# Patient Record
Sex: MALE | Race: ASIAN | NOT HISPANIC OR LATINO
[De-identification: names, ages, dates, MRNs, and addresses within clinical notes are randomized per-mention and may not be internally consistent; named-entity substitution may affect disease eponyms.]

---

## 2017-01-20 ENCOUNTER — APPOINTMENT (OUTPATIENT)
Dept: CARDIOLOGY | Facility: CLINIC | Age: 67
End: 2017-01-20

## 2017-01-20 ENCOUNTER — NON-APPOINTMENT (OUTPATIENT)
Age: 67
End: 2017-01-20

## 2017-01-20 VITALS
BODY MASS INDEX: 28.35 KG/M2 | WEIGHT: 198 LBS | DIASTOLIC BLOOD PRESSURE: 81 MMHG | HEIGHT: 70 IN | RESPIRATION RATE: 17 BRPM | OXYGEN SATURATION: 97 % | SYSTOLIC BLOOD PRESSURE: 143 MMHG | HEART RATE: 82 BPM

## 2017-01-20 DIAGNOSIS — R07.89 OTHER CHEST PAIN: ICD-10-CM

## 2017-01-20 DIAGNOSIS — I77.9 DISORDER OF ARTERIES AND ARTERIOLES, UNSPECIFIED: ICD-10-CM

## 2017-01-23 PROBLEM — R07.89 ATYPICAL CHEST PAIN: Status: ACTIVE | Noted: 2017-01-23

## 2017-01-25 ENCOUNTER — RX RENEWAL (OUTPATIENT)
Age: 67
End: 2017-01-25

## 2017-01-25 DIAGNOSIS — R51 HEADACHE: ICD-10-CM

## 2017-02-06 ENCOUNTER — APPOINTMENT (OUTPATIENT)
Dept: MRI IMAGING | Facility: HOSPITAL | Age: 67
End: 2017-02-06

## 2017-02-07 ENCOUNTER — APPOINTMENT (OUTPATIENT)
Dept: MRI IMAGING | Facility: HOSPITAL | Age: 67
End: 2017-02-07

## 2017-03-07 ENCOUNTER — APPOINTMENT (OUTPATIENT)
Dept: ELECTROPHYSIOLOGY | Facility: CLINIC | Age: 67
End: 2017-03-07

## 2017-03-07 ENCOUNTER — APPOINTMENT (OUTPATIENT)
Dept: CARDIOLOGY | Facility: CLINIC | Age: 67
End: 2017-03-07

## 2017-03-07 ENCOUNTER — NON-APPOINTMENT (OUTPATIENT)
Age: 67
End: 2017-03-07

## 2017-03-07 VITALS — HEART RATE: 48 BPM | SYSTOLIC BLOOD PRESSURE: 146 MMHG | DIASTOLIC BLOOD PRESSURE: 78 MMHG | OXYGEN SATURATION: 97 %

## 2017-03-07 DIAGNOSIS — N52.9 MALE ERECTILE DYSFUNCTION, UNSPECIFIED: ICD-10-CM

## 2017-03-07 DIAGNOSIS — R00.2 PALPITATIONS: ICD-10-CM

## 2017-03-08 ENCOUNTER — APPOINTMENT (OUTPATIENT)
Dept: ORTHOPEDIC SURGERY | Facility: CLINIC | Age: 67
End: 2017-03-08

## 2017-04-11 ENCOUNTER — OTHER (OUTPATIENT)
Age: 67
End: 2017-04-11

## 2017-05-15 ENCOUNTER — RX RENEWAL (OUTPATIENT)
Age: 67
End: 2017-05-15

## 2017-05-15 DIAGNOSIS — E03.9 HYPOTHYROIDISM, UNSPECIFIED: ICD-10-CM

## 2017-05-15 RX ORDER — SILDENAFIL CITRATE 50 MG/1
50 TABLET, FILM COATED ORAL
Qty: 10 | Refills: 2 | Status: DISCONTINUED | COMMUNITY
Start: 2017-03-08 | End: 2017-05-15

## 2017-06-23 ENCOUNTER — APPOINTMENT (OUTPATIENT)
Dept: CARDIOLOGY | Facility: CLINIC | Age: 67
End: 2017-06-23

## 2017-06-23 ENCOUNTER — NON-APPOINTMENT (OUTPATIENT)
Age: 67
End: 2017-06-23

## 2017-06-23 VITALS
HEART RATE: 76 BPM | DIASTOLIC BLOOD PRESSURE: 76 MMHG | RESPIRATION RATE: 16 BRPM | WEIGHT: 207 LBS | HEIGHT: 70 IN | OXYGEN SATURATION: 98 % | SYSTOLIC BLOOD PRESSURE: 118 MMHG | BODY MASS INDEX: 29.63 KG/M2

## 2017-06-23 LAB
INR PPP: 2.2 RATIO
QUALITY CONTROL: YES

## 2017-07-06 ENCOUNTER — MEDICATION RENEWAL (OUTPATIENT)
Age: 67
End: 2017-07-06

## 2017-08-09 ENCOUNTER — RX RENEWAL (OUTPATIENT)
Age: 67
End: 2017-08-09

## 2017-10-11 ENCOUNTER — RX RENEWAL (OUTPATIENT)
Age: 67
End: 2017-10-11

## 2017-10-18 LAB
INR PPP: 1.5
PT BLD: 15.1

## 2017-11-07 LAB
INR PPP: 1.8
PT BLD: 17.7

## 2017-11-29 ENCOUNTER — APPOINTMENT (OUTPATIENT)
Dept: CARDIOLOGY | Facility: CLINIC | Age: 67
End: 2017-11-29
Payer: MEDICARE

## 2017-11-29 ENCOUNTER — NON-APPOINTMENT (OUTPATIENT)
Age: 67
End: 2017-11-29

## 2017-11-29 ENCOUNTER — RX RENEWAL (OUTPATIENT)
Age: 67
End: 2017-11-29

## 2017-11-29 VITALS
SYSTOLIC BLOOD PRESSURE: 111 MMHG | HEART RATE: 79 BPM | OXYGEN SATURATION: 97 % | BODY MASS INDEX: 29.35 KG/M2 | HEIGHT: 70 IN | DIASTOLIC BLOOD PRESSURE: 72 MMHG | RESPIRATION RATE: 16 BRPM | WEIGHT: 205 LBS

## 2017-11-29 DIAGNOSIS — E78.5 HYPERLIPIDEMIA, UNSPECIFIED: ICD-10-CM

## 2017-11-29 LAB
INR PPP: 2.2 RATIO
POCT-PROTHROMBIN TIME: 28.6 SECS
QUALITY CONTROL: YES

## 2017-11-29 PROCEDURE — 99214 OFFICE O/P EST MOD 30 MIN: CPT

## 2017-11-29 PROCEDURE — 93000 ELECTROCARDIOGRAM COMPLETE: CPT

## 2017-11-29 PROCEDURE — 85610 PROTHROMBIN TIME: CPT | Mod: QW

## 2017-11-29 RX ORDER — AMOXICILLIN 500 MG/1
500 CAPSULE ORAL
Qty: 20 | Refills: 2 | Status: ACTIVE | COMMUNITY
Start: 2017-11-29 | End: 1900-01-01

## 2017-11-30 ENCOUNTER — APPOINTMENT (OUTPATIENT)
Dept: INTERNAL MEDICINE | Facility: CLINIC | Age: 67
End: 2017-11-30
Payer: MEDICARE

## 2017-11-30 ENCOUNTER — MEDICATION RENEWAL (OUTPATIENT)
Age: 67
End: 2017-11-30

## 2017-11-30 VITALS
HEIGHT: 70 IN | WEIGHT: 200 LBS | HEART RATE: 80 BPM | OXYGEN SATURATION: 99 % | RESPIRATION RATE: 16 BRPM | SYSTOLIC BLOOD PRESSURE: 128 MMHG | DIASTOLIC BLOOD PRESSURE: 70 MMHG | TEMPERATURE: 97.7 F | BODY MASS INDEX: 28.63 KG/M2

## 2017-11-30 DIAGNOSIS — Z82.49 FAMILY HISTORY OF ISCHEMIC HEART DISEASE AND OTHER DISEASES OF THE CIRCULATORY SYSTEM: ICD-10-CM

## 2017-11-30 DIAGNOSIS — I65.29 OCCLUSION AND STENOSIS OF UNSPECIFIED CAROTID ARTERY: ICD-10-CM

## 2017-11-30 DIAGNOSIS — E55.9 VITAMIN D DEFICIENCY, UNSPECIFIED: ICD-10-CM

## 2017-11-30 DIAGNOSIS — Z78.9 OTHER SPECIFIED HEALTH STATUS: ICD-10-CM

## 2017-11-30 DIAGNOSIS — Z28.21 IMMUNIZATION NOT CARRIED OUT BECAUSE OF PATIENT REFUSAL: ICD-10-CM

## 2017-11-30 DIAGNOSIS — Z00.00 ENCOUNTER FOR GENERAL ADULT MEDICAL EXAMINATION W/OUT ABNORMAL FINDINGS: ICD-10-CM

## 2017-11-30 PROCEDURE — 99214 OFFICE O/P EST MOD 30 MIN: CPT

## 2017-11-30 RX ORDER — SILDENAFIL CITRATE 100 MG/1
100 TABLET, FILM COATED ORAL
Qty: 6 | Refills: 0 | Status: DISCONTINUED | COMMUNITY
Start: 2017-02-06 | End: 2017-11-30

## 2017-11-30 RX ORDER — ASPIRIN 81 MG/1
81 TABLET, CHEWABLE ORAL
Refills: 0 | Status: ACTIVE | COMMUNITY
Start: 2017-03-08

## 2017-11-30 RX ORDER — ALPRAZOLAM 0.25 MG/1
0.25 TABLET ORAL
Qty: 10 | Refills: 0 | Status: DISCONTINUED | COMMUNITY
Start: 2017-02-06 | End: 2017-11-30

## 2017-12-01 LAB
25(OH)D3 SERPL-MCNC: 25.5 NG/ML
ALBUMIN SERPL ELPH-MCNC: 5 G/DL
ALP BLD-CCNC: 62 U/L
ALT SERPL-CCNC: 34 U/L
ANION GAP SERPL CALC-SCNC: 19 MMOL/L
APPEARANCE: CLEAR
AST SERPL-CCNC: 60 U/L
BACTERIA: NEGATIVE
BASOPHILS # BLD AUTO: 0.06 K/UL
BASOPHILS NFR BLD AUTO: 0.7 %
BILIRUB SERPL-MCNC: 0.8 MG/DL
BILIRUBIN URINE: NEGATIVE
BLOOD URINE: NEGATIVE
BUN SERPL-MCNC: 20 MG/DL
CALCIUM SERPL-MCNC: 10.5 MG/DL
CHLORIDE SERPL-SCNC: 97 MMOL/L
CHOLEST SERPL-MCNC: 206 MG/DL
CHOLEST/HDLC SERPL: 3.2 RATIO
CO2 SERPL-SCNC: 27 MMOL/L
COLOR: YELLOW
CREAT SERPL-MCNC: 1.3 MG/DL
EOSINOPHIL # BLD AUTO: 0.37 K/UL
EOSINOPHIL NFR BLD AUTO: 4.3 %
GLUCOSE QUALITATIVE U: NEGATIVE MG/DL
GLUCOSE SERPL-MCNC: 102 MG/DL
HBA1C MFR BLD HPLC: 7 %
HCT VFR BLD CALC: 48.5 %
HDLC SERPL-MCNC: 65 MG/DL
HGB BLD-MCNC: 16.2 G/DL
HYALINE CASTS: 0 /LPF
IMM GRANULOCYTES NFR BLD AUTO: 0.7 %
KETONES URINE: NEGATIVE
LDLC SERPL CALC-MCNC: 101 MG/DL
LEUKOCYTE ESTERASE URINE: NEGATIVE
LYMPHOCYTES # BLD AUTO: 2.94 K/UL
LYMPHOCYTES NFR BLD AUTO: 34.3 %
MAN DIFF?: NORMAL
MCHC RBC-ENTMCNC: 30.6 PG
MCHC RBC-ENTMCNC: 33.4 GM/DL
MCV RBC AUTO: 91.5 FL
MICROSCOPIC-UA: NORMAL
MONOCYTES # BLD AUTO: 0.9 K/UL
MONOCYTES NFR BLD AUTO: 10.5 %
NEUTROPHILS # BLD AUTO: 4.25 K/UL
NEUTROPHILS NFR BLD AUTO: 49.5 %
NITRITE URINE: NEGATIVE
PH URINE: 5
PLATELET # BLD AUTO: 218 K/UL
POTASSIUM SERPL-SCNC: 3.9 MMOL/L
PROT SERPL-MCNC: 8.8 G/DL
PROTEIN URINE: NEGATIVE MG/DL
PSA SERPL-MCNC: 2.05 NG/ML
RBC # BLD: 5.3 M/UL
RBC # FLD: 14.5 %
RED BLOOD CELLS URINE: 0 /HPF
SODIUM SERPL-SCNC: 143 MMOL/L
SPECIFIC GRAVITY URINE: 1.01
SQUAMOUS EPITHELIAL CELLS: 0 /HPF
T4 FREE SERPL-MCNC: 1.7 NG/DL
T4 SERPL-MCNC: 9.3 UG/DL
TRIGL SERPL-MCNC: 199 MG/DL
TSH SERPL-ACNC: 4.13 UIU/ML
UROBILINOGEN URINE: NEGATIVE MG/DL
WBC # FLD AUTO: 8.58 K/UL
WHITE BLOOD CELLS URINE: 0 /HPF

## 2018-01-04 ENCOUNTER — RX RENEWAL (OUTPATIENT)
Age: 68
End: 2018-01-04

## 2018-02-11 ENCOUNTER — RX RENEWAL (OUTPATIENT)
Age: 68
End: 2018-02-11

## 2018-02-16 ENCOUNTER — APPOINTMENT (OUTPATIENT)
Dept: CARDIOLOGY | Facility: CLINIC | Age: 68
End: 2018-02-16
Payer: MEDICARE

## 2018-02-16 ENCOUNTER — NON-APPOINTMENT (OUTPATIENT)
Age: 68
End: 2018-02-16

## 2018-02-16 VITALS
BODY MASS INDEX: 28.92 KG/M2 | HEART RATE: 69 BPM | WEIGHT: 202 LBS | OXYGEN SATURATION: 97 % | RESPIRATION RATE: 15 BRPM | HEIGHT: 70 IN | DIASTOLIC BLOOD PRESSURE: 64 MMHG | SYSTOLIC BLOOD PRESSURE: 106 MMHG

## 2018-02-16 DIAGNOSIS — I34.0 NONRHEUMATIC MITRAL (VALVE) INSUFFICIENCY: ICD-10-CM

## 2018-02-16 DIAGNOSIS — I48.91 UNSPECIFIED ATRIAL FIBRILLATION: ICD-10-CM

## 2018-02-16 LAB — INR PPP: 1.3 RATIO

## 2018-02-16 PROCEDURE — 99214 OFFICE O/P EST MOD 30 MIN: CPT

## 2018-02-16 PROCEDURE — 93000 ELECTROCARDIOGRAM COMPLETE: CPT

## 2018-02-16 PROCEDURE — 85610 PROTHROMBIN TIME: CPT | Mod: QW

## 2018-02-26 LAB
INR PPP: 1.9
PT BLD: 19

## 2018-03-09 LAB
INR PPP: 2.9
PT BLD: 28.2

## 2018-03-20 LAB
INR PPP: 1.7
PT BLD: 17.3

## 2018-05-16 ENCOUNTER — RX RENEWAL (OUTPATIENT)
Age: 68
End: 2018-05-16

## 2018-06-20 LAB
INR PPP: 1.6
PT BLD: 16.6

## 2018-06-22 LAB
INR PPP: 1.8
PT BLD: 17.9

## 2018-06-26 ENCOUNTER — RX RENEWAL (OUTPATIENT)
Age: 68
End: 2018-06-26

## 2018-08-03 ENCOUNTER — NON-APPOINTMENT (OUTPATIENT)
Age: 68
End: 2018-08-03

## 2018-08-03 ENCOUNTER — APPOINTMENT (OUTPATIENT)
Dept: CARDIOLOGY | Facility: CLINIC | Age: 68
End: 2018-08-03
Payer: MEDICARE

## 2018-08-03 VITALS
WEIGHT: 198 LBS | HEART RATE: 71 BPM | BODY MASS INDEX: 28.35 KG/M2 | SYSTOLIC BLOOD PRESSURE: 124 MMHG | HEIGHT: 70 IN | DIASTOLIC BLOOD PRESSURE: 70 MMHG | RESPIRATION RATE: 15 BRPM | OXYGEN SATURATION: 97 %

## 2018-08-03 DIAGNOSIS — I50.33 ACUTE ON CHRONIC DIASTOLIC (CONGESTIVE) HEART FAILURE: ICD-10-CM

## 2018-08-03 DIAGNOSIS — R06.02 SHORTNESS OF BREATH: ICD-10-CM

## 2018-08-03 LAB
INR PPP: 2
PT BLD: 19.9

## 2018-08-03 PROCEDURE — 93000 ELECTROCARDIOGRAM COMPLETE: CPT

## 2018-08-03 PROCEDURE — 99215 OFFICE O/P EST HI 40 MIN: CPT

## 2018-08-11 ENCOUNTER — RX RENEWAL (OUTPATIENT)
Age: 68
End: 2018-08-11

## 2018-08-11 RX ORDER — LABETALOL HYDROCHLORIDE 100 MG/1
100 TABLET, FILM COATED ORAL
Qty: 180 | Refills: 2 | Status: ACTIVE | COMMUNITY
Start: 2017-03-08 | End: 1900-01-01

## 2018-08-21 ENCOUNTER — RX RENEWAL (OUTPATIENT)
Age: 68
End: 2018-08-21

## 2018-08-21 RX ORDER — WARFARIN SODIUM 2.5 MG/1
2.5 TABLET ORAL
Qty: 90 | Refills: 1 | Status: ACTIVE | COMMUNITY
Start: 2017-10-11 | End: 1900-01-01

## 2018-09-13 LAB
INR PPP: 2.5
PT BLD: 24.9

## 2018-11-12 ENCOUNTER — RX RENEWAL (OUTPATIENT)
Age: 68
End: 2018-11-12

## 2018-11-12 RX ORDER — LEVOTHYROXINE SODIUM 0.05 MG/1
50 TABLET ORAL
Qty: 90 | Refills: 1 | Status: ACTIVE | COMMUNITY
Start: 2018-05-16 | End: 1900-01-01

## 2018-11-28 LAB
INR PPP: 2.2
PT BLD: 22

## 2018-12-07 ENCOUNTER — APPOINTMENT (OUTPATIENT)
Dept: CARDIOLOGY | Facility: CLINIC | Age: 68
End: 2018-12-07
Payer: MEDICARE

## 2018-12-07 VITALS
BODY MASS INDEX: 28.49 KG/M2 | HEART RATE: 74 BPM | WEIGHT: 199 LBS | RESPIRATION RATE: 15 BRPM | HEIGHT: 70 IN | OXYGEN SATURATION: 98 % | SYSTOLIC BLOOD PRESSURE: 144 MMHG | DIASTOLIC BLOOD PRESSURE: 86 MMHG

## 2018-12-07 DIAGNOSIS — R06.00 DYSPNEA, UNSPECIFIED: ICD-10-CM

## 2018-12-07 DIAGNOSIS — I25.10 ATHEROSCLEROTIC HEART DISEASE OF NATIVE CORONARY ARTERY W/OUT ANGINA PECTORIS: ICD-10-CM

## 2018-12-07 PROCEDURE — 99214 OFFICE O/P EST MOD 30 MIN: CPT | Mod: 25

## 2018-12-07 PROCEDURE — 93000 ELECTROCARDIOGRAM COMPLETE: CPT | Mod: 59

## 2018-12-07 PROCEDURE — 93015 CV STRESS TEST SUPVJ I&R: CPT

## 2018-12-08 PROBLEM — I25.10 ARTERIOSCLEROSIS OF CORONARY ARTERY: Status: ACTIVE | Noted: 2018-08-03

## 2018-12-08 PROBLEM — R06.00 DYSPNEA: Status: ACTIVE | Noted: 2018-08-03

## 2018-12-08 NOTE — PHYSICAL EXAM
[General Appearance - Well Developed] : well developed [Normal Appearance] : normal appearance [Well Groomed] : well groomed [General Appearance - Well Nourished] : well nourished [No Deformities] : no deformities [General Appearance - In No Acute Distress] : no acute distress [Normal Conjunctiva] : the conjunctiva exhibited no abnormalities [Eyelids - No Xanthelasma] : the eyelids demonstrated no xanthelasmas [Normal Oral Mucosa] : normal oral mucosa [No Oral Pallor] : no oral pallor [No Oral Cyanosis] : no oral cyanosis [Normal Jugular Venous A Waves Present] : normal jugular venous A waves present [Normal Jugular Venous V Waves Present] : normal jugular venous V waves present [No Jugular Venous Beckford A Waves] : no jugular venous beckford A waves [Respiration, Rhythm And Depth] : normal respiratory rhythm and effort [Exaggerated Use Of Accessory Muscles For Inspiration] : no accessory muscle use [Auscultation Breath Sounds / Voice Sounds] : lungs were clear to auscultation bilaterally [Heart Rate And Rhythm] : heart rate and rhythm were normal [Heart Sounds] : normal S1 and S2 [Murmurs] : no murmurs present [Abdomen Soft] : soft [Abdomen Tenderness] : non-tender [Abdomen Mass (___ Cm)] : no abdominal mass palpated [Abnormal Walk] : normal gait [Gait - Sufficient For Exercise Testing] : the gait was sufficient for exercise testing [Nail Clubbing] : no clubbing of the fingernails [Cyanosis, Localized] : no localized cyanosis [Petechial Hemorrhages (___cm)] : no petechial hemorrhages [Skin Color & Pigmentation] : normal skin color and pigmentation [] : no rash [No Venous Stasis] : no venous stasis [Skin Lesions] : no skin lesions [No Skin Ulcers] : no skin ulcer [No Xanthoma] : no  xanthoma was observed [Oriented To Time, Place, And Person] : oriented to person, place, and time [Affect] : the affect was normal [Mood] : the mood was normal [No Anxiety] : not feeling anxious [FreeTextEntry1] : left pleural friction rub

## 2018-12-08 NOTE — REASON FOR VISIT
[Follow-Up - Clinic] : a clinic follow-up of [Dyspnea] : dyspnea [Spouse] : spouse [FreeTextEntry1] : Jorge comes for follow up. he has some difficulty when he bends over. he just returned from [facility] [place] and was able to climb the mountains.

## 2018-12-29 ENCOUNTER — RX RENEWAL (OUTPATIENT)
Age: 68
End: 2018-12-29

## 2019-04-05 ENCOUNTER — APPOINTMENT (OUTPATIENT)
Dept: CARDIOLOGY | Facility: CLINIC | Age: 69
End: 2019-04-05

## 2019-06-03 PROBLEM — N52.9 MALE ERECTILE DISORDER: Status: ACTIVE | Noted: 2017-03-08

## 2025-07-02 ENCOUNTER — TELEPHONE (OUTPATIENT)
Dept: SCHEDULING | Facility: CLINIC | Age: 75
End: 2025-07-02
Payer: COMMERCIAL

## 2025-07-02 NOTE — TELEPHONE ENCOUNTER
New Patient Appointment Request    Name of caller: Chandler Garcia     Reason for Visit: Afib    Insurance: [facility] medicare    Recent Cardiac Test/Procedures:   TTE    Referred by: Sterling Almanzar MD    Primary Care Physician: Isra Gleason MD     Previous Cardiologist name and phone number:   Sterling Almanzar MD  Ph. [phone]    Best contact number: [phone]     Additional notes/History: Pt states he received a phone call yesterday from the office to schedule NPV.

## 2025-07-08 DIAGNOSIS — I48.0 PAROXYSMAL ATRIAL FIBRILLATION (CMS/HCC): Primary | ICD-10-CM

## 2025-07-09 LAB
BASOPHILS # BLD AUTO: 0.1 X10E3/UL (ref 0–0.2)
BASOPHILS NFR BLD AUTO: 1 %
EOSINOPHIL # BLD AUTO: 0.4 X10E3/UL (ref 0–0.4)
EOSINOPHIL NFR BLD AUTO: 5 %
ERYTHROCYTE [DISTWIDTH] IN BLOOD BY AUTOMATED COUNT: 16.9 % (ref 11.6–15.4)
HCT VFR BLD AUTO: 52.4 % (ref 37.5–51)
HGB BLD-MCNC: 15.9 G/DL (ref 13–17.7)
IMM GRANULOCYTES # BLD AUTO: 0.1 X10E3/UL (ref 0–0.1)
IMM GRANULOCYTES NFR BLD AUTO: 1 %
LYMPHOCYTES # BLD AUTO: 2.2 X10E3/UL (ref 0.7–3.1)
LYMPHOCYTES NFR BLD AUTO: 28 %
MCH RBC QN AUTO: 25.9 PG (ref 26.6–33)
MCHC RBC AUTO-ENTMCNC: 30.3 G/DL (ref 31.5–35.7)
MCV RBC AUTO: 85 FL (ref 79–97)
MONOCYTES # BLD AUTO: 0.8 X10E3/UL (ref 0.1–0.9)
MONOCYTES NFR BLD AUTO: 11 %
NEUTROPHILS # BLD AUTO: 4.2 X10E3/UL (ref 1.4–7)
NEUTROPHILS NFR BLD AUTO: 54 %
PLATELET # BLD AUTO: 159 X10E3/UL (ref 150–450)
RBC # BLD AUTO: 6.14 X10E6/UL (ref 4.14–5.8)
WBC # BLD AUTO: 7.8 X10E3/UL (ref 3.4–10.8)

## 2025-07-10 LAB
ALBUMIN SERPL-MCNC: 4.9 G/DL (ref 3.8–4.8)
ALP SERPL-CCNC: 97 IU/L (ref 44–121)
ALT SERPL-CCNC: 18 IU/L (ref 0–44)
AST SERPL-CCNC: 29 IU/L (ref 0–40)
BILIRUB SERPL-MCNC: 0.7 MG/DL (ref 0–1.2)
BUN SERPL-MCNC: 34 MG/DL (ref 8–27)
BUN/CREAT SERPL: 22 (ref 10–24)
CALCIUM SERPL-MCNC: 10 MG/DL (ref 8.6–10.2)
CHLORIDE SERPL-SCNC: 103 MMOL/L (ref 96–106)
CO2 SERPL-SCNC: 21 MMOL/L (ref 20–29)
CREAT SERPL-MCNC: 1.58 MG/DL (ref 0.76–1.27)
EGFRCR SERPLBLD CKD-EPI 2021: 45 ML/MIN/1.73
GLOBULIN SER CALC-MCNC: 2.7 G/DL (ref 1.5–4.5)
GLUCOSE SERPL-MCNC: 158 MG/DL (ref 70–99)
POTASSIUM SERPL-SCNC: 4.4 MMOL/L (ref 3.5–5.2)
PROT SERPL-MCNC: 7.6 G/DL (ref 6–8.5)
SODIUM SERPL-SCNC: 142 MMOL/L (ref 134–144)

## 2025-07-14 ENCOUNTER — ANESTHESIA (OUTPATIENT)
Dept: CARDIOLOGY | Facility: HOSPITAL | Age: 75
Setting detail: HOSPITAL OUTPATIENT SURGERY
End: 2025-07-14
Payer: COMMERCIAL

## 2025-07-14 ENCOUNTER — HOSPITAL ENCOUNTER (OUTPATIENT)
Facility: HOSPITAL | Age: 75
Discharge: HOME | End: 2025-07-15
Attending: INTERNAL MEDICINE | Admitting: INTERNAL MEDICINE
Payer: COMMERCIAL

## 2025-07-14 DIAGNOSIS — I48.19 PERSISTENT ATRIAL FIBRILLATION (CMS/HCC): Primary | ICD-10-CM

## 2025-07-14 DIAGNOSIS — I48.0 PAROXYSMAL ATRIAL FIBRILLATION (CMS/HCC): ICD-10-CM

## 2025-07-14 PROBLEM — J18.9 PNEUMONIA DUE TO INFECTIOUS ORGANISM: Status: RESOLVED | Noted: 2020-11-25 | Resolved: 2025-07-14

## 2025-07-14 PROBLEM — R79.1 SUPRATHERAPEUTIC INR: Status: RESOLVED | Noted: 2020-11-30 | Resolved: 2025-07-14

## 2025-07-14 PROBLEM — E03.9 HYPOTHYROIDISM: Chronic | Status: ACTIVE | Noted: 2024-06-11

## 2025-07-14 PROBLEM — R09.02 HYPOXIA: Status: RESOLVED | Noted: 2020-12-31 | Resolved: 2025-07-14

## 2025-07-14 PROBLEM — U07.1 COVID-19: Status: RESOLVED | Noted: 2020-12-31 | Resolved: 2025-07-14

## 2025-07-14 PROBLEM — E78.5 HYPERLIPIDEMIA: Chronic | Status: ACTIVE | Noted: 2024-06-11

## 2025-07-14 PROBLEM — I06.0 RHEUMATIC AORTIC STENOSIS: Status: ACTIVE | Noted: 2024-06-11

## 2025-07-14 PROBLEM — K92.1 MELENA: Status: RESOLVED | Noted: 2020-11-29 | Resolved: 2025-07-14

## 2025-07-14 PROBLEM — H40.9 GLAUCOMA (INCREASED EYE PRESSURE): Status: ACTIVE | Noted: 2025-07-14

## 2025-07-14 PROBLEM — I10 HYPERTENSION: Chronic | Status: ACTIVE | Noted: 2024-06-11

## 2025-07-14 PROBLEM — N18.9 CKD (CHRONIC KIDNEY DISEASE): Status: ACTIVE | Noted: 2020-11-30

## 2025-07-14 PROBLEM — I05.0 RHEUMATIC MITRAL STENOSIS: Status: ACTIVE | Noted: 2024-06-11

## 2025-07-14 PROBLEM — I25.10 ATHEROSCLEROSIS OF NATIVE CORONARY ARTERY OF NATIVE HEART WITHOUT ANGINA PECTORIS: Chronic | Status: ACTIVE | Noted: 2024-06-11

## 2025-07-14 PROBLEM — I42.9 CARDIOMYOPATHY (CMS/HCC): Status: ACTIVE | Noted: 2024-06-11

## 2025-07-14 LAB
GLUCOSE BLD-MCNC: 122 MG/DL (ref 70–99)
POCT ACT-LR: >400 SEC (ref 116–155)
POCT TEST: ABNORMAL

## 2025-07-14 PROCEDURE — C1730 CATH, EP, 19 OR FEW ELECT: HCPCS | Performed by: INTERNAL MEDICINE

## 2025-07-14 PROCEDURE — 63700000 HC SELF-ADMINISTRABLE DRUG: Performed by: PHYSICIAN ASSISTANT

## 2025-07-14 PROCEDURE — B246YZZ ULTRASONOGRAPHY OF RIGHT AND LEFT HEART USING OTHER CONTRAST: ICD-10-PCS | Performed by: INTERNAL MEDICINE

## 2025-07-14 PROCEDURE — 25000000 HC PHARMACY GENERAL: Performed by: NURSE ANESTHETIST, CERTIFIED REGISTERED

## 2025-07-14 PROCEDURE — 85347 COAGULATION TIME ACTIVATED: CPT | Performed by: INTERNAL MEDICINE

## 2025-07-14 PROCEDURE — 63600000 HC DRUGS/DETAIL CODE: Mod: JZ | Performed by: NURSE ANESTHETIST, CERTIFIED REGISTERED

## 2025-07-14 PROCEDURE — C1760 CLOSURE DEV, VASC: HCPCS | Performed by: INTERNAL MEDICINE

## 2025-07-14 PROCEDURE — 27200000 HC STERILE SUPPLY: Performed by: INTERNAL MEDICINE

## 2025-07-14 PROCEDURE — 71000001 HC PACU PHASE 1 INITIAL 30MIN: Performed by: INTERNAL MEDICINE

## 2025-07-14 PROCEDURE — 63600000 HC DRUGS/DETAIL CODE: Performed by: NURSE ANESTHETIST, CERTIFIED REGISTERED

## 2025-07-14 PROCEDURE — C1769 GUIDE WIRE: HCPCS | Performed by: INTERNAL MEDICINE

## 2025-07-14 PROCEDURE — C1733 CATH, EP, OTHR THAN COOL-TIP: HCPCS | Performed by: INTERNAL MEDICINE

## 2025-07-14 PROCEDURE — 4A023FZ MEASUREMENT OF CARDIAC RHYTHM, PERCUTANEOUS APPROACH: ICD-10-PCS | Performed by: INTERNAL MEDICINE

## 2025-07-14 PROCEDURE — 25800000 HC PHARMACY IV SOLUTIONS: Performed by: NURSE ANESTHETIST, CERTIFIED REGISTERED

## 2025-07-14 PROCEDURE — C1731 CATH, EP, 20 OR MORE ELEC: HCPCS | Performed by: INTERNAL MEDICINE

## 2025-07-14 PROCEDURE — 93656 COMPRE EP EVAL ABLTJ ATR FIB: CPT | Performed by: INTERNAL MEDICINE

## 2025-07-14 PROCEDURE — 02K83ZZ MAP CONDUCTION MECHANISM, PERCUTANEOUS APPROACH: ICD-10-PCS | Performed by: INTERNAL MEDICINE

## 2025-07-14 PROCEDURE — C1894 INTRO/SHEATH, NON-LASER: HCPCS | Performed by: INTERNAL MEDICINE

## 2025-07-14 PROCEDURE — 71000011 HC PACU PHASE 1 EA ADDL MIN: Performed by: INTERNAL MEDICINE

## 2025-07-14 PROCEDURE — 93657 TX L/R ATRIAL FIB ADDL: CPT | Performed by: INTERNAL MEDICINE

## 2025-07-14 PROCEDURE — 4A0234Z MEASUREMENT OF CARDIAC ELECTRICAL ACTIVITY, PERCUTANEOUS APPROACH: ICD-10-PCS | Performed by: INTERNAL MEDICINE

## 2025-07-14 PROCEDURE — C1759 CATH, INTRA ECHOCARDIOGRAPHY: HCPCS | Performed by: INTERNAL MEDICINE

## 2025-07-14 PROCEDURE — 5A2204Z RESTORATION OF CARDIAC RHYTHM, SINGLE: ICD-10-PCS | Performed by: INTERNAL MEDICINE

## 2025-07-14 PROCEDURE — 37000001 HC ANESTHESIA GENERAL: Performed by: INTERNAL MEDICINE

## 2025-07-14 PROCEDURE — C1732 CATH, EP, DIAG/ABL, 3D/VECT: HCPCS | Performed by: INTERNAL MEDICINE

## 2025-07-14 PROCEDURE — 93005 ELECTROCARDIOGRAM TRACING: CPT | Performed by: PHYSICIAN ASSISTANT

## 2025-07-14 PROCEDURE — 02583ZZ DESTRUCTION OF CONDUCTION MECHANISM, PERCUTANEOUS APPROACH: ICD-10-PCS | Performed by: INTERNAL MEDICINE

## 2025-07-14 PROCEDURE — 93005 ELECTROCARDIOGRAM TRACING: CPT | Performed by: INTERNAL MEDICINE

## 2025-07-14 PROCEDURE — 25000000 HC PHARMACY GENERAL: Performed by: INTERNAL MEDICINE

## 2025-07-14 DEVICE — PERCLOSE™ PROSTYLE™ SUTURE-MEDIATED CLOSURE AND REPAIR SYSTEM
Type: IMPLANTABLE DEVICE | Status: FUNCTIONAL
Brand: PERCLOSE™ PROSTYLE™

## 2025-07-14 RX ORDER — ROCURONIUM BROMIDE 10 MG/ML
INJECTION, SOLUTION INTRAVENOUS AS NEEDED
Status: DISCONTINUED | OUTPATIENT
Start: 2025-07-14 | End: 2025-07-14 | Stop reason: SURG

## 2025-07-14 RX ORDER — GLYCOPYRROLATE 0.6MG/3ML
SYRINGE (ML) INTRAVENOUS AS NEEDED
Status: DISCONTINUED | OUTPATIENT
Start: 2025-07-14 | End: 2025-07-14 | Stop reason: SURG

## 2025-07-14 RX ORDER — DEXTROSE 40 %
15-30 GEL (GRAM) ORAL AS NEEDED
Status: DISCONTINUED | OUTPATIENT
Start: 2025-07-14 | End: 2025-07-15 | Stop reason: HOSPADM

## 2025-07-14 RX ORDER — DEXTROSE 50 % IN WATER (D50W) INTRAVENOUS SYRINGE
25 AS NEEDED
Status: DISCONTINUED | OUTPATIENT
Start: 2025-07-14 | End: 2025-07-15 | Stop reason: HOSPADM

## 2025-07-14 RX ORDER — CALCIUM CARBONATE/VITAMIN D3 250-3.125
2 TABLET ORAL DAILY
Status: DISCONTINUED | OUTPATIENT
Start: 2025-07-14 | End: 2025-07-15 | Stop reason: HOSPADM

## 2025-07-14 RX ORDER — HEPARIN SODIUM 10000 [USP'U]/100ML
INJECTION, SOLUTION INTRAVENOUS CONTINUOUS PRN
Status: DISCONTINUED | OUTPATIENT
Start: 2025-07-14 | End: 2025-07-14 | Stop reason: SURG

## 2025-07-14 RX ORDER — ONDANSETRON HYDROCHLORIDE 2 MG/ML
4 INJECTION, SOLUTION INTRAVENOUS EVERY 8 HOURS PRN
Status: DISCONTINUED | OUTPATIENT
Start: 2025-07-14 | End: 2025-07-15 | Stop reason: HOSPADM

## 2025-07-14 RX ORDER — HEPARIN SOD,PORCINE/0.9 % NACL 4K/1000 ML
INTRAVENOUS SOLUTION INTRAVENOUS ONCE
Status: DISCONTINUED | OUTPATIENT
Start: 2025-07-14 | End: 2025-07-14 | Stop reason: HOSPADM

## 2025-07-14 RX ORDER — METOPROLOL SUCCINATE 100 MG/1
100 TABLET, EXTENDED RELEASE ORAL DAILY
COMMUNITY

## 2025-07-14 RX ORDER — LIDOCAINE HYDROCHLORIDE 10 MG/ML
INJECTION, SOLUTION INFILTRATION; PERINEURAL
Status: DISCONTINUED | OUTPATIENT
Start: 2025-07-14 | End: 2025-07-14 | Stop reason: HOSPADM

## 2025-07-14 RX ORDER — METOPROLOL SUCCINATE 100 MG/1
100 TABLET, EXTENDED RELEASE ORAL DAILY
Status: DISCONTINUED | OUTPATIENT
Start: 2025-07-15 | End: 2025-07-15 | Stop reason: HOSPADM

## 2025-07-14 RX ORDER — DOCUSATE SODIUM 100 MG/1
100 CAPSULE, LIQUID FILLED ORAL 2 TIMES DAILY PRN
Status: DISCONTINUED | OUTPATIENT
Start: 2025-07-14 | End: 2025-07-15 | Stop reason: HOSPADM

## 2025-07-14 RX ORDER — ALUMINUM HYDROXIDE, MAGNESIUM HYDROXIDE, AND SIMETHICONE 1200; 120; 1200 MG/30ML; MG/30ML; MG/30ML
30 SUSPENSION ORAL EVERY 4 HOURS PRN
Status: DISCONTINUED | OUTPATIENT
Start: 2025-07-14 | End: 2025-07-15 | Stop reason: HOSPADM

## 2025-07-14 RX ORDER — MIDAZOLAM HYDROCHLORIDE 2 MG/2ML
INJECTION, SOLUTION INTRAMUSCULAR; INTRAVENOUS AS NEEDED
Status: DISCONTINUED | OUTPATIENT
Start: 2025-07-14 | End: 2025-07-14 | Stop reason: SURG

## 2025-07-14 RX ORDER — ATORVASTATIN CALCIUM 40 MG/1
40 TABLET, FILM COATED ORAL DAILY
Status: DISCONTINUED | OUTPATIENT
Start: 2025-07-15 | End: 2025-07-15 | Stop reason: HOSPADM

## 2025-07-14 RX ORDER — METOPROLOL SUCCINATE 25 MG/1
75 TABLET, EXTENDED RELEASE ORAL DAILY
COMMUNITY

## 2025-07-14 RX ORDER — PHENYLEPHRINE HCL IN 0.9% NACL 50MG/250ML
PLASTIC BAG, INJECTION (ML) INTRAVENOUS CONTINUOUS PRN
Status: DISCONTINUED | OUTPATIENT
Start: 2025-07-14 | End: 2025-07-14 | Stop reason: SURG

## 2025-07-14 RX ORDER — HEPARIN SODIUM 1000 [USP'U]/ML
INJECTION, SOLUTION INTRAVENOUS; SUBCUTANEOUS AS NEEDED
Status: DISCONTINUED | OUTPATIENT
Start: 2025-07-14 | End: 2025-07-14 | Stop reason: SURG

## 2025-07-14 RX ORDER — SODIUM CHLORIDE 9 MG/ML
INJECTION, SOLUTION INTRAVENOUS CONTINUOUS PRN
Status: DISCONTINUED | OUTPATIENT
Start: 2025-07-14 | End: 2025-07-14 | Stop reason: SURG

## 2025-07-14 RX ORDER — CALCIUM CARBONATE/VITAMIN D3 250-3.125
2 TABLET ORAL DAILY
COMMUNITY

## 2025-07-14 RX ORDER — ADHESIVE BANDAGE 7/8"
15-30 BANDAGE TOPICAL AS NEEDED
Status: DISCONTINUED | OUTPATIENT
Start: 2025-07-14 | End: 2025-07-15 | Stop reason: HOSPADM

## 2025-07-14 RX ORDER — ASCORBIC ACID 500 MG
500 TABLET ORAL DAILY
Status: DISCONTINUED | OUTPATIENT
Start: 2025-07-15 | End: 2025-07-15 | Stop reason: HOSPADM

## 2025-07-14 RX ORDER — ONDANSETRON HYDROCHLORIDE 2 MG/ML
INJECTION, SOLUTION INTRAVENOUS AS NEEDED
Status: DISCONTINUED | OUTPATIENT
Start: 2025-07-14 | End: 2025-07-14 | Stop reason: SURG

## 2025-07-14 RX ORDER — FENTANYL CITRATE 50 UG/ML
INJECTION, SOLUTION INTRAMUSCULAR; INTRAVENOUS AS NEEDED
Status: DISCONTINUED | OUTPATIENT
Start: 2025-07-14 | End: 2025-07-14 | Stop reason: SURG

## 2025-07-14 RX ORDER — ASCORBIC ACID 500 MG
500 TABLET ORAL DAILY
COMMUNITY

## 2025-07-14 RX ORDER — ACETAMINOPHEN 325 MG/1
975 TABLET ORAL EVERY 6 HOURS PRN
Status: DISCONTINUED | OUTPATIENT
Start: 2025-07-14 | End: 2025-07-15 | Stop reason: HOSPADM

## 2025-07-14 RX ORDER — LIDOCAINE HYDROCHLORIDE 10 MG/ML
INJECTION, SOLUTION INFILTRATION; PERINEURAL AS NEEDED
Status: DISCONTINUED | OUTPATIENT
Start: 2025-07-14 | End: 2025-07-14 | Stop reason: SURG

## 2025-07-14 RX ORDER — TADALAFIL 5 MG/1
5 TABLET ORAL DAILY PRN
COMMUNITY

## 2025-07-14 RX ORDER — ONDANSETRON HYDROCHLORIDE 2 MG/ML
4 INJECTION, SOLUTION INTRAVENOUS
Status: DISCONTINUED | OUTPATIENT
Start: 2025-07-14 | End: 2025-07-15 | Stop reason: HOSPADM

## 2025-07-14 RX ORDER — PHENYLEPHRINE HYDROCHLORIDE 10 MG/ML
INJECTION INTRAVENOUS AS NEEDED
Status: DISCONTINUED | OUTPATIENT
Start: 2025-07-14 | End: 2025-07-14 | Stop reason: SURG

## 2025-07-14 RX ORDER — DOCUSATE SODIUM 100 MG/1
100 CAPSULE, LIQUID FILLED ORAL 2 TIMES DAILY PRN
COMMUNITY

## 2025-07-14 RX ORDER — SENNOSIDES 8.6 MG/1
1 TABLET ORAL 2 TIMES DAILY PRN
Status: DISCONTINUED | OUTPATIENT
Start: 2025-07-14 | End: 2025-07-15 | Stop reason: HOSPADM

## 2025-07-14 RX ORDER — PROPOFOL 10 MG/ML
INJECTION, EMULSION INTRAVENOUS AS NEEDED
Status: DISCONTINUED | OUTPATIENT
Start: 2025-07-14 | End: 2025-07-14 | Stop reason: SURG

## 2025-07-14 RX ORDER — ATORVASTATIN CALCIUM 40 MG/1
40 TABLET, FILM COATED ORAL DAILY
COMMUNITY

## 2025-07-14 RX ADMIN — SODIUM CHLORIDE: 9 INJECTION, SOLUTION INTRAVENOUS at 14:24

## 2025-07-14 RX ADMIN — LIDOCAINE HYDROCHLORIDE 5 ML: 10 INJECTION, SOLUTION INFILTRATION; PERINEURAL at 14:43

## 2025-07-14 RX ADMIN — FENTANYL CITRATE 100 MCG: 50 INJECTION, SOLUTION INTRAMUSCULAR; INTRAVENOUS at 14:43

## 2025-07-14 RX ADMIN — PHENYLEPHRINE HYDROCHLORIDE 100 MCG: 10 INJECTION INTRAVENOUS at 14:43

## 2025-07-14 RX ADMIN — GLYCOPYRROLATE 0.4 MG: 0.2 INJECTION, SOLUTION INTRAMUSCULAR; INTRAVITREAL at 16:08

## 2025-07-14 RX ADMIN — PHENYLEPHRINE HYDROCHLORIDE 100 MCG: 10 INJECTION INTRAVENOUS at 15:05

## 2025-07-14 RX ADMIN — ROCURONIUM BROMIDE 100 MG: 10 INJECTION, SOLUTION INTRAVENOUS at 14:43

## 2025-07-14 RX ADMIN — PHENYLEPHRINE HYDROCHLORIDE 50 MCG: 10 INJECTION INTRAVENOUS at 15:02

## 2025-07-14 RX ADMIN — HEPARIN SODIUM 5000 UNITS: 1000 INJECTION, SOLUTION INTRAVENOUS; SUBCUTANEOUS at 15:34

## 2025-07-14 RX ADMIN — HEPARIN SODIUM 2000 UNITS/HR: 10000 INJECTION, SOLUTION INTRAVENOUS at 15:52

## 2025-07-14 RX ADMIN — METOPROLOL SUCCINATE ER TABLETS 75 MG: 25 TABLET, FILM COATED, EXTENDED RELEASE ORAL at 22:02

## 2025-07-14 RX ADMIN — HEPARIN SODIUM 5000 UNITS: 1000 INJECTION, SOLUTION INTRAVENOUS; SUBCUTANEOUS at 15:52

## 2025-07-14 RX ADMIN — PROPOFOL 150 MG: 10 INJECTION, EMULSION INTRAVENOUS at 14:43

## 2025-07-14 RX ADMIN — Medication 50 MCG/MIN: at 15:05

## 2025-07-14 RX ADMIN — SUGAMMADEX 200 MG: 100 INJECTION, SOLUTION INTRAVENOUS at 17:31

## 2025-07-14 RX ADMIN — PHENYLEPHRINE HYDROCHLORIDE 100 MCG: 10 INJECTION INTRAVENOUS at 16:55

## 2025-07-14 RX ADMIN — ACETAMINOPHEN 975 MG: 325 TABLET ORAL at 22:02

## 2025-07-14 RX ADMIN — MIDAZOLAM HYDROCHLORIDE 2 MG: 1 INJECTION, SOLUTION INTRAMUSCULAR; INTRAVENOUS at 14:26

## 2025-07-14 RX ADMIN — ONDANSETRON HYDROCHLORIDE 4 MG: 2 SOLUTION INTRAMUSCULAR; INTRAVENOUS at 17:20

## 2025-07-14 ASSESSMENT — ENCOUNTER SYMPTOMS
CHANGE IN BOWEL HABIT: 0
VOMITING: 0
PARESTHESIAS: 0
ORTHOPNEA: 0
COUGH: 0
NAUSEA: 0
CHILLS: 0
FOCAL WEAKNESS: 0
DYSURIA: 0
CLAUDICATION: 0
DEPRESSION: 0
WEAKNESS: 0
FREQUENCY: 0
SYNCOPE: 0
CONSTIPATION: 0
ALTERED MENTAL STATUS: 0
PND: 0
NEAR-SYNCOPE: 0
WHEEZING: 0
DIARRHEA: 0
NUMBNESS: 0
HEMOPTYSIS: 0
ABDOMINAL PAIN: 0
DYSPNEA ON EXERTION: 1
FEVER: 0
BLURRED VISION: 0
ANOREXIA: 0

## 2025-07-14 NOTE — H&P
Electrophysiology  H&P       Referral: [facility]     Chandler Garcia is a 74 y.o. male with a history of rheumatic heart disease, status post aortic and mitral valve replacement (with concomitant maze procedure and left atrial appendage excision) , coronary artery disease, status postcoronary artery bypass grafting, persistent atrial fibrillation, who presents to [facility] today for atrial fibrillation ablation.    He has a longstanding history of atrial fibrillation, with Maze procedure at the time of valve surgery.  He had refractory AF despite antiarrhythmic drug therapy, including amiodarone.  He has been off of amiodarone since 2021.  He has developed recurrent symptomatic  persistent AF.  Consideration for inpatient dofetilide loading versus possible percutaneous AF ablation.    Since last office visit with his cardiologist in [place], he continues with exertional fatigue and dyspnea. He denies chest pain, presyncope, syncope, orthopnea, PND, edema, weight gain. He has continued on Eliquis, reporting no recent missed doses, and last dose this AM. His rate control regimen includes metoprolol succinate 100 mg qAM and 75 mg qPM.    Medical History:  Persistent atrial fibrillation: Maze procedure with left atrial appendage excision at the time of valve surgery in 2015.  Treated with amiodarone for several years, discontinued in 2021.  Recently with recurrent persistent symptomatic atrial fibrillation.  Considering inpatient dofetilide administration versus percutaneous ablation.  Coronary artery disease: LIMA to LAD and saphenous vein graft to the RCA during valve replacement in 2015  Rheumatic aortic stenosis, status post bovine AVR 2015   Rheumatic Mitral stenosis, status post bovine MVR 2015  Nonischemic cardiomyopathy: Severely reduced LV systolic function noted at the time of a prolonged hospitalization with COVID-pneumonia in 2021 -subsequent normalization of  "LV systolic function  Hypertension  Hyperlipidemia  Hypothyroidism  Congestive heart failure: Previous low EF, now normalized.  Continues with symptomatic diastolic heart failure.  CKD -stage IIIa    Social History:   He is originally from the [facility].  He is  and has 4 children.  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  He never smoked.  Reports rare alcohol intake.    Family History: No family history on file.    Allergies: Patient has no known allergies.    Medications Ordered Prior to Encounter[1]  Visit Vitals  Ht 1.778 m (5' 10\")   Wt 87.1 kg (192 lb)   BMI 27.55 kg/m²         Review of Systems   Constitutional: Positive for malaise/fatigue. Negative for chills and fever.   Eyes:  Negative for blurred vision.   Cardiovascular:  Positive for dyspnea on exertion. Negative for chest pain, claudication, leg swelling, near-syncope, orthopnea, paroxysmal nocturnal dyspnea and syncope.   Respiratory:  Negative for cough, hemoptysis and wheezing.    Endocrine: Negative for cold intolerance and heat intolerance.   Skin:  Negative for rash.   Gastrointestinal:  Negative for abdominal pain, anorexia, change in bowel habit, constipation, diarrhea, melena, nausea and vomiting.   Genitourinary:  Negative for dysuria and frequency.   Neurological:  Negative for focal weakness, numbness, paresthesias and weakness.   Psychiatric/Behavioral:  Negative for altered mental status and depression.          OBJECTIVE  Objective   Vital Signs for the last 24 hours:     Visit Vitals  Ht 1.778 m (5' 10\")   Wt 87.1 kg (192 lb)   BMI 27.55 kg/m²     P 90  /72  R 12    Physical Exam  Constitutional:       General: He is not in acute distress.     Appearance: Normal appearance. He is not ill-appearing.   HENT:      Head: Normocephalic.      Mouth/Throat:      Mouth: Mucous membranes are moist.   Eyes:      Extraocular Movements: Extraocular movements intact.      Pupils: Pupils are equal, round, and reactive to light. "   Neck:      Vascular: No carotid bruit.   Cardiovascular:      Rate and Rhythm: Rhythm irregular.      Pulses: Normal pulses.      Heart sounds: No murmur heard.     No gallop.   Pulmonary:      Breath sounds: Normal breath sounds. No wheezing or rales.   Abdominal:      General: Bowel sounds are normal.      Palpations: Abdomen is soft.   Musculoskeletal:      Cervical back: Neck supple. No tenderness.   Skin:     General: Skin is warm and dry.   Neurological:      General: No focal deficit present.      Mental Status: He is alert and oriented to person, place, and time.   Psychiatric:         Mood and Affect: Mood normal.         Behavior: Behavior normal.         Laboratory results:  CBC Results         07/09/25     0758    WBC 7.8    RBC 6.14    HGB 15.9    HCT 52.4    MCV 85    MCH 25.9    MCHC 30.3              CMP Results         07/09/25     0758        K 4.4    Cl 103    CO2 21    Glucose 158    BUN 34    Creatinine 1.58    AST 29    ALT 18    Albumin 4.9    EGFR 45                    Imaging  Echocardiogram:  Echocardiogram was performed 03/26/2024   1.Concenttric left ventricular hypertrophy with a normal cavity size and preserved systolic function. EF estimated at 60-65%.   2.Diastolic function not assessed with prosthetic mitral valve.   3.Bioprosthetic mitral valve, appears well seated and functioning normally. Peak trans valvular gradient of 10 mmhg and a mean gradient of 3 mmhg.   4.Bioprosthetic aortic valve, appears well seated and functioning normally peak instantaneous pressure gradient of 19 mmhg and a mean gradient of 11 mhg. Trace perivalvular aortic regurgitation.   5.Mild tricuspid regurgitation with an estimated pulmonary artery systolic pressure of 32 mmhg (assuming a right atrial pressure of 5 mmhg) normal sized right heart with preserved right ventricular systolic function.     Stress Test:  Pharmacologic challenge with exercise was performed 11/27/2024  1) Poor exercise  tolerance. Accelerated HR and physiologic BP response to exercise. Stress EKG does not meet criteria for exercise induced ischemia. Chest pain did not occur.  2) Normal perfusion. No evidence of ischemia or scar.  3) Global post stress LV systolic function was normal. (EF 63%) Stress LV regional wall motion was normal. Stress LV regional wall thickening was normal.  4) No prior study available for comparison.         ECG/Telemetry  A 12-lead ECG was performed   4/9/24   Sinus Bradycardia 58 bpm, RBBB     ECG today: Atrial fibrillation, ventricular response 86 bpm.  Right bundle branch block, left posterior fascicular block.    Telemetry today: Atrial fibrillation, controlled ventricular response.  IVCD.      ASSESSMENT AND PLAN  Assessment & Plan  Persistent atrial fibrillation (CMS/[facility])  Mr. Garcia is a 74-year-old gentleman with history of symptomatic persistent atrial fibrillation in the setting of valvular heart disease, status post AVR, MVR, Maze procedure, left atrial appendage excision, CAD status post CABG, history of nonischemic cardiomyopathy with recovered ejection fraction.    He is referred for and presents for atrial fibrillation ablation today.  The details of the procedure were reviewed with the patient and his wife, including risks, benefits, alternatives.  These risks include but are not limited to vascular injury, valvular injury, cardiac perforation with tamponade, myocardial infarction, stroke, death.  There is an increased risk of bleeding relative to uninterrupted anticoagulation.  All of his questions were answered.  He agrees to proceed with ablation today.  Will continue with apixaban and metoprolol post procedure.  Will admit to telemetry overnight, as he lives greater than 2 hours away.            [1]   No current facility-administered medications on file prior to encounter.     Current Outpatient Medications on File Prior to Encounter   Medication Sig Dispense Refill    apixaban  (ELIQUIS) 5 mg tablet Take 5 mg by mouth 2 (two) times a day.      ascorbic acid (VITAMIN C) 500 mg tablet Take 500 mg by mouth daily.      atorvastatin (LIPITOR) 40 mg tablet Take 40 mg by mouth daily.      calcium-vitamin D3 250 mg-3.125 mcg (125 unit) per tablet Take 2 tablets by mouth daily.      docusate sodium (COLACE) 100 mg capsule Take 100 mg by mouth 2 (two) times a day as needed for constipation.      empagliflozin (JARDIANCE) 25 mg tablet Take 25 mg by mouth daily.      FISH OIL-omega-3 fatty acids (FISH OIL) 340-1,000 mg capsule Take 2 capsules by mouth 2 (two) times a day.      metoprolol succinate XL (TOPROL-XL) 100 mg 24 hr tablet Take 100 mg by mouth daily.      metoprolol succinate XL (TOPROL-XL) 25 mg 24 hr tablet Take 75 mg by mouth daily.      multivitamin tablet Take 1 tablet by mouth daily.      tadalafiL (CIALIS) 5 mg tablet Take 5 mg by mouth daily as needed for erectile dysfunction.

## 2025-07-14 NOTE — NURSING NOTE
Pt arrived to 1S from cath lab, pt slightly lethargic from anesthesia but arouses to voice, orientedx4, groin sites assessed, c/d/I, vitals taken, oriented pt and pt's wife to room and call bell system, pt to sit up at 1945, oob at 2145

## 2025-07-14 NOTE — ANESTHESIA PROCEDURE NOTES
Airway  Reason: elective    Start Time: 7/14/2025 2:46 PM    General Information and Staff   Patient location during procedure: OR  Anesthesiologist: Jose Prasad MD  Resident/CRNA: Shelly Heller CRNA  Performed: resident/CRNA   Performed by: Shelly Heller CRNA  Authorized by: Jose Prasad MD        Patient Condition  Indications for airway management: anesthesia  Patient position: sniffing  MILS maintained throughout  Sedation level: general     Final Airway Details   Preoxygenated: yes  Final airway type: endotracheal airway  Successful airway: ETT  Cuffed: yes   Successful intubation technique: video laryngoscopy  Endotracheal tube insertion site: oral  Blade: Josué  Blade size: #4  ETT size (mm): 7.5  Cormack-Lehane Classification: grade I - full view of glottis  Placement verified by: chest auscultation and capnometry   Measured from: teeth  ETT to teeth (cm): 23  Ventilation between attempts: none  Number of attempts at approach: 1  Number of other approaches attempted: 0  Atraumatic airway insertion

## 2025-07-14 NOTE — ANESTHESIA PREPROCEDURE EVALUATION
Relevant Problems   CARDIOVASCULAR   (+) Paroxysmal atrial fibrillation (CMS/HCC)       ROS/Med Hx     No past surgical history on file.  Patient Active Problem List   Diagnosis    Paroxysmal atrial fibrillation (CMS/HCC)       Current Facility-Administered Medications   Medication Dose Route Frequency    heparin (porcine) in NSS for ablation   Intracoronary Once    sodium chloride  500 mL intravenous Once       Prior to Admission medications   Not on File       CBC Results         07/09/25     0758    WBC 7.8    RBC 6.14    HGB 15.9    HCT 52.4    MCV 85    MCH 25.9    MCHC 30.3                BMP Results         07/09/25     0758        K 4.4    Cl 103    CO2 21    Glucose 158    BUN 34    Creatinine 1.58    EGFR 45               [facility]  Outside Information  Results  Echocardiogram transesophageal (Order [id number])      suggestion  Information displayed in this report may not trend or trigger automated decision support.     Echocardiogram transesophageal  Order: [id number]  Narrative    Conclusion    1. Moderate to severely decreased left ventricle systolic function. EF  30-35%. There is no thrombus.  2. Mildly dilated left atrium. No thrombus is detected in the left atrial  appendage stub. The interatrial septum is intact with no evidence for an  atrial septal defect.  2. Trace tricuspid regurgitation.  Aortic Valve:  The aortic valve is trileaflet.  No aortic regurgitation is present.  Arteries:  The aortic root is normal size.  The aortic arch is not well visualized.  The pulmonary is not well visualized.  Effusion:  There is no pericardial effusion.  Left Atrium:  The left atrium is mildly dilated.  No thrombus is detected in the left atrial appendage.  The interatrial septum is intact with no evidence for an atrial septal defect.  Left Ventricle:  The Left Ventricular Ejection fraction = 30-35%.  There is moderate global hypokinesis of the left ventricle.  There is no  thrombus.  Mitral  Valve:  The mitral valve is normal in structure and function.  There is no mitral regurgitation noted.  Procedure:  An intravenous line was placed. A topical anesthetic agent was used for  oropharangeal anesthesia. A bite block was inserted.  IV concious sedation was administered using propofol.  A multifrequency, multiplane transesopheageal echocardiographic endoscope was  inserted and manipulated in the standard fashion to achieve multiplane views.  The transesophageal probe was passed without difficulty.  Limited views were obtained.  CY Completed  Pulmonic Valve:  The pulmonic valve is not well visualized.  There is no pulmonic valvular regurgitation.  Right Atrium:  Right atrial size is normal.  Right Ventricle:  The right ventricle is grossly normal size.  Tricuspid Valve:  The tricuspid valve is not well visualized.  There is trace tricuspid regurgitation.  [place] Z-Scores(Vital Signs)  BMI: 26.26 kilograms/m  Diastolic Pressure: 103 mmHg  Heart Rate: 81 BPM  BSA(Haycock): 2.04 m  Weight (metric): 83.01 kg  Height (metric): 177.8 cm  Systolic Pressure: 144 mmHg  All Measurements     Exam End: 05/26/21 16:40    Specimen Collected: 05/26/21 15:29 Last Resulted: 06/04/21 16:20   Received From: [facility]  Result Received: 07/14/25 08:31    View Encounter        Received Information                No orders to display      Physical Exam    Airway   Mallampati: II   TM distance: >3 FB   Neck ROM: full  Cardiovascular - normal   Rhythm: regular   Rate: normalPulmonary - normal   clear to auscultation  Dental - normal        Anesthesia Plan    Plan: general    Technique: general endotracheal     Lines and Monitors: PIV and BIS     Airway: video laryngoscope and oral intubation    3 ASA  Anesthetic plan and risks discussed with: patient  Induction:    intravenous   Postop Plan:   Patient Disposition: inpatient floor planned admission   Pain Management: IV analgesics

## 2025-07-14 NOTE — Clinical Note
5000 units heparin bolus given, Heparin drip started at 2000 units/hour 
Accessed site: left femoral vein. X2, guidewire inserted x2
Accessed site: right femoral vein. Guidewire inserted
Advisor removed 
Closure device placed for the right femoral vein. First Closure device used: Perclose. Inserted as pre-close. Closure pressure manually applied.
Closure device placed for the right femoral vein. First Closure device used: Perclose. Inserted as pre-close. Hemostasis achieved.
Closure device placed for the right femoral vein. First Closure device used: Perclose. Suture completed. Closure pressure manually applied. Hemostasis achieved.
Closure device placed for the right femoral vein. First Closure device used: Perclose. Suture completed. Closure pressure manually applied. Hemostasis achieved. Versacross sheath removed
Defib pads applied to patient in a anterior/posterior configuration.
Dry, sterile dressing applied to b/l groins
EP catheter inserted at the left atrium.
Grounding pad applied: right upper back. Applied by: Silvia Tejeda RN.
ID band present and verified with patient name and . Patient label affixed to IV tubing connected to patient. 
Into RA 
LSP, roof area where old scar is located 
LSPV roof area 
Left atrium 
Left atrium 
PFA to LIPV
PFA to LSPV 
PFA to RIPV 
PFA to RSPV
PFA to posterior wall
Paced around LPVs and RPVs, exit block 
Pacing from PFA catheter to assess block, reconnection noted
Patient placed on procedure table in supine position with arms at side. Positioning devices: all pressure points padded, arm board under arms, heel pads in place, safety strap applied, wrist straps in place, donut foam under head, pillow under knees and gel ring under head.
Pigtail versacross guidewire Inserted under fluoro.
Sheath exchanged in the right femoral vein. 8F ultimum removed over the guidewire, 14f dilator inserted over the guidewire then removed, versacross faradrive sheath inserted
Sheath inserted in the left femoral vein. Guidewire removed
Sheath inserted in the left femoral vein. Guidewire removed
Sheath inserted in the right femoral vein. Guidewire removed
Total PFA lesions: 62
Transseptal puncture completed with RF assisstance. VersaCross generator utilized with
Universal procedure checklist completed. Airway assessment completed. Physician agrees with pre-sedation assessment..
Verified procedural consent signed. Verified complete H&P in chart. Blood consent verified.
Versacross dilator and guidewire removed 
Z-stitch applied to LFV, 7F and 10F sheaths removed and manual pressure held. 
Yes

## 2025-07-14 NOTE — ANESTHESIOLOGIST PRE-PROCEDURE ATTESTATION
Pre-Procedure Patient Identification:  I am the Primary Anesthesiologist and have identified the patient on 07/14/25 at 1:10 PM.   I have confirmed the procedure(s) will be performed by the following surgeon/proceduralist Vel Felder MD.

## 2025-07-14 NOTE — ASSESSMENT & PLAN NOTE
Mr. Garcia is a 74-year-old gentleman with history of symptomatic persistent atrial fibrillation in the setting of valvular heart disease, status post AVR, MVR, Maze procedure, left atrial appendage excision, CAD status post CABG, history of nonischemic cardiomyopathy with recovered ejection fraction.    He is referred for and presents for atrial fibrillation ablation today.  The details of the procedure were reviewed with the patient and his wife, including risks, benefits, alternatives.  These risks include but are not limited to vascular injury, valvular injury, cardiac perforation with tamponade, myocardial infarction, stroke, death.  There is an increased risk of bleeding relative to uninterrupted anticoagulation.  All of his questions were answered.  He agrees to proceed with ablation today.  Will continue with apixaban and metoprolol post procedure.  Will admit to telemetry overnight, as he lives greater than 2 hours away.

## 2025-07-14 NOTE — ANESTHESIA POSTPROCEDURE EVALUATION
Patient: Chandler Garcia    Procedure Summary       Date: 07/14/25 Room / Location: [facility] EP LAB 5 / [facility] CARD[facility]AC CATH/EP    Anesthesia Start: 1422 Anesthesia Stop:     Procedure: A-fib pulsed field ablation Diagnosis:       Paroxysmal atrial fibrillation (CMS/HCC)      (atrial fibrillation)    Providers: Vel Felder MD Responsible Provider: Jose Prasad MD    Anesthesia Type: general ASA Status: 3            Anesthesia Type: general  PACU Vitals    No data found in the last 10 encounters.           Anesthesia Post Evaluation    Pain management: adequate  Patient location during evaluation: PACU  Patient participation: complete - patient participated  Level of consciousness: awake and alert  Cardiovascular status: acceptable  Airway Patency: adequate  Respiratory status: acceptable  Hydration status: acceptable  Anesthetic complications: no

## 2025-07-14 NOTE — DISCHARGE INSTRUCTIONS
Drink plenty of fluids over the next few days. Call Dr. Felder if you have significantly darker urine color that persists for more than 2 days, or if your urine output becomes minimal or stops.    Groin Site Care:   A bruise or small lump under the skin where the catheters were is normal. These usually go away within one week.   Please check your wound site daily to make sure there is no redness, bleeding, or swelling.   The groin dressing, if present, should be removed the day following the procedure. A Band-Aid can be used if needed.    Showering:   You may shower 24 hours after your procedure. Clean the area with mild soap and water. Do not rub the area. Pat the area dry with a clean towel.   Do not take a bath or submerge the area under water for three days.     If you have bleeding where your catheter was:  Lie down and hold direct pressure for 10 minutes. If bleeding does not stop in 10 minutes, or if there is a large amount of bleeding, call 911. If bleeding does stop, rest for at least 4 hours. Call your doctor.    Activity:   Do not strain, push, pull, run or lift anything over 10 pounds for 7 days.   You may exercise in 7 days.   You may walk and climb stairs when you return home.    Driving:   Do not drive a car or use any machinery for 2 days. Someone else must drive you home today.    Call your doctor if you have:   Increased redness, heat, pus, bruising, or bleeding at the site after 24 hours.   Swelling at the catheter site.   Increased pain at the catheter site.   Numbness, pain or coolness in the leg.   Temperature of 100.5 degrees Fahrenheit or greater.   Chest pain, shortness of breath, severe heartburn, dizziness, fainting, recurrent symptoms, or any questions.

## 2025-07-14 NOTE — POST-PROCEDURE NOTE
Status post atrial fibrillation ablation (pulmonary vein isolation and left atrial posterior wall isolation)    Anesthesia: General, ET tube.  Extubated in procedure room  Complications: None  Lines/drains: None  Vascular access: Right femoral vein x 1 (16 Fr).  Right femoral venous access site closed with 2 Perclose suture delivery systems.  Left femoral vein x 2 (10 Fr, 7 Fr).  Left femoral venous access sites with figure-of-eight suture and manual pressure for hemostasis.  Findings: See note in cardiovascular section for complete details.  Baseline rhythm was atrial fibrillation.  After vascular access, baseline intracardiac echo demonstrated preserved LV size and function.  No significant pericardial effusion.  No evidence of left atrial appendage (previous excision).  Heparin was given with target  seconds.  Atrial transseptal puncture performed.  Left atrial endocardial voltage mapping performed with a circular mapping catheter.  Baseline voltage map demonstrated evidence of conduction into the right superior pulmonary vein, right inferior pulmonary vein, left superior pulmonary vein.  Significant voltage abnormality on the LA posterior wall, but with voltage present.  The mapping catheter was exchanged for a pulsed field ablation catheter (Ivaldi).  Pulmonary vein isolation and left atrial posterior wall isolation performed.  After ablation, cardioversion was performed, restoring sinus rhythm.  The ablation catheter was again exchanged for the mapping catheter and a left atrial endocardial voltage map was repeated, demonstrating PVI and left atrial posterior wall isolation.  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  There was a small area with signal and capture demonstrated just outside of the left superior pulmonary vein along the anterior aspect/Coumadin ridge.  EP study was performed with burst pacing and programmed stimulation with no inducible atrial flutter.  The  mapping catheter was again exchanged for the pulsed field ablation catheter and additional PFA was delivered at the anterior/superior aspect just outside the left superior pulmonary vein.  Post ablation intracardiac echo demonstrated no changes.    Post procedure: Hemodynamically stable.  Awake alert and oriented.  No focal neurologic deficits.  Maintaining sinus rhythm.  Bilateral femoral sites without bleeding or hematomas.  Plan: Admit to telemetry overnight.  Resume Eliquis, next dose this evening, as ordered.  Continue metoprolol.  Close monitoring of vascular access sites.  Both legs straight for 2 hours.  Out of bed 4 hours after hemostasis.  Anticipate discharge home in the morning.

## 2025-07-15 VITALS
RESPIRATION RATE: 20 BRPM | HEART RATE: 71 BPM | HEIGHT: 70 IN | DIASTOLIC BLOOD PRESSURE: 50 MMHG | WEIGHT: 192 LBS | BODY MASS INDEX: 27.49 KG/M2 | SYSTOLIC BLOOD PRESSURE: 104 MMHG | OXYGEN SATURATION: 97 % | TEMPERATURE: 98.1 F

## 2025-07-15 LAB
ANION GAP SERPL CALC-SCNC: 10 MEQ/L (ref 3–15)
ATRIAL RATE: 61
ATRIAL RATE: 81
BUN SERPL-MCNC: 25 MG/DL (ref 7–25)
CALCIUM SERPL-MCNC: 8.7 MG/DL (ref 8.6–10.3)
CHLORIDE SERPL-SCNC: 106 MEQ/L (ref 98–107)
CO2 SERPL-SCNC: 21 MEQ/L (ref 21–31)
CREAT SERPL-MCNC: 1.6 MG/DL (ref 0.7–1.3)
EGFRCR SERPLBLD CKD-EPI 2021: 44.7 ML/MIN/1.73M*2
ERYTHROCYTE [DISTWIDTH] IN BLOOD BY AUTOMATED COUNT: 16.7 % (ref 11.6–14.4)
GLUCOSE SERPL-MCNC: 175 MG/DL (ref 70–99)
HCT VFR BLD AUTO: 44.1 % (ref 40.1–51)
HGB BLD-MCNC: 13.5 G/DL (ref 13.7–17.5)
MAGNESIUM SERPL-MCNC: 1.4 MG/DL (ref 1.8–2.5)
MCH RBC QN AUTO: 26 PG (ref 28–33.2)
MCHC RBC AUTO-ENTMCNC: 30.6 G/DL (ref 32.2–36.5)
MCV RBC AUTO: 85 FL (ref 83–98)
P AXIS: 83
P AXIS: 94
PLATELET # BLD AUTO: 121 K/UL (ref 150–350)
PMV BLD AUTO: 10.7 FL (ref 9.4–12.4)
POTASSIUM SERPL-SCNC: 3.9 MEQ/L (ref 3.5–5.1)
PR INTERVAL: 226
PR INTERVAL: 240
QRS DURATION: 138
QRS DURATION: 142
QRS DURATION: 146
QT INTERVAL: 398
QT INTERVAL: 402
QT INTERVAL: 476
QTC CALCULATION(BAZETT): 466
QTC CALCULATION(BAZETT): 476
QTC CALCULATION(BAZETT): 479
R AXIS: 103
R AXIS: 108
R AXIS: 122
RBC # BLD AUTO: 5.19 M/UL (ref 4.5–5.8)
SODIUM SERPL-SCNC: 137 MEQ/L (ref 136–145)
T WAVE AXIS: -4
T WAVE AXIS: 0
T WAVE AXIS: 7
VENTRICULAR RATE: 61
VENTRICULAR RATE: 81
VENTRICULAR RATE: 86
WBC # BLD AUTO: 9.52 K/UL (ref 3.8–10.5)

## 2025-07-15 PROCEDURE — 85027 COMPLETE CBC AUTOMATED: CPT | Performed by: PHYSICIAN ASSISTANT

## 2025-07-15 PROCEDURE — 200200 PR NO CHARGE: Performed by: INTERNAL MEDICINE

## 2025-07-15 PROCEDURE — 83735 ASSAY OF MAGNESIUM: CPT | Performed by: PHYSICIAN ASSISTANT

## 2025-07-15 PROCEDURE — 93010 ELECTROCARDIOGRAM REPORT: CPT | Mod: 76 | Performed by: INTERNAL MEDICINE

## 2025-07-15 PROCEDURE — 36415 COLL VENOUS BLD VENIPUNCTURE: CPT | Performed by: PHYSICIAN ASSISTANT

## 2025-07-15 PROCEDURE — 63700000 HC SELF-ADMINISTRABLE DRUG: Performed by: PHYSICIAN ASSISTANT

## 2025-07-15 PROCEDURE — 80048 BASIC METABOLIC PNL TOTAL CA: CPT | Performed by: PHYSICIAN ASSISTANT

## 2025-07-15 PROCEDURE — 93010 ELECTROCARDIOGRAM REPORT: CPT | Performed by: INTERNAL MEDICINE

## 2025-07-15 RX ORDER — ACETAMINOPHEN 325 MG/1
650 TABLET ORAL EVERY 4 HOURS PRN
Start: 2025-07-15 | End: 2025-08-14

## 2025-07-15 RX ADMIN — Medication 2 TABLET: at 08:06

## 2025-07-15 RX ADMIN — EMPAGLIFLOZIN 25 MG: 25 TABLET, FILM COATED ORAL at 08:06

## 2025-07-15 RX ADMIN — THERA TABS 1 TABLET: TAB at 08:06

## 2025-07-15 RX ADMIN — ACETAMINOPHEN 975 MG: 325 TABLET ORAL at 06:49

## 2025-07-15 RX ADMIN — ATORVASTATIN CALCIUM 40 MG: 40 TABLET, FILM COATED ORAL at 08:06

## 2025-07-15 RX ADMIN — METOPROLOL SUCCINATE 100 MG: 100 TABLET, EXTENDED RELEASE ORAL at 08:06

## 2025-07-15 RX ADMIN — OXYCODONE HYDROCHLORIDE AND ACETAMINOPHEN 500 MG: 500 TABLET ORAL at 08:06

## 2025-07-15 RX ADMIN — APIXABAN 5 MG: 5 TABLET, FILM COATED ORAL at 06:51

## 2025-07-15 ASSESSMENT — COGNITIVE AND FUNCTIONAL STATUS - GENERAL
CLIMB 3 TO 5 STEPS WITH RAILING: 3 - A LITTLE
WALKING IN HOSPITAL ROOM: 4 - NONE
STANDING UP FROM CHAIR USING ARMS: 4 - NONE
MOVING TO AND FROM BED TO CHAIR: 4 - NONE

## 2025-07-15 NOTE — PLAN OF CARE
Problem: Adult Inpatient Plan of Care  Goal: Patient-Specific Goal (Individualized)  Outcome: Progressing  Flowsheets (Taken 7/14/2025 2220)  Patient/Family-Specific Goals (Include Timeframe):   d/c home tomorrow     Individualized Care Needs:   Monitor HM/VS   surveillance of b/l groin CCL sites   no fall     Anxieties, Fears or Concerns: none     Problem: Adult Inpatient Plan of Care  Goal: Absence of Hospital-Acquired Illness or Injury  Outcome: Progressing   Plan of Care Review  Plan of Care Reviewed With: patient, spouse  Progress: improving  Outcome Evaluation: Monitor HM/VS; surveillance of b/l groin CCL sites; no fall;

## 2025-07-15 NOTE — ASSESSMENT & PLAN NOTE
Mr. Garcia is a 74-year-old gentleman with history of symptomatic persistent atrial fibrillation in the setting of valvular heart disease, status post AVR, MVR, Maze procedure, left atrial appendage excision, CAD status post CABG, history of nonischemic cardiomyopathy with recovered ejection fraction.    - Status post atrial fibrillation ablation (pulmonary vein isolation and left atrial posterior wall isolation)   - Vital signs and telemetry stable overnight  - B/L groins are stable and sutures were removed.  Pt educated to monitor groins and call with concerns  - Continue home medications including Eliquis and Toprol  - Follow up with Dr. Almanzar in [place], [place].  No need to return to [facility] with [place]. Felder unless there is a concern  - Written discharge instructions were provided in [facility]  - Plan of care reviewed with Dr. Felder

## 2025-07-15 NOTE — PLAN OF CARE
"Care Coordination Admission Assessment Note    General Information:  Readmission Within the last 30 days: no previous admission in last 30 days  Does patient have a : No  Patient-Specific Goals (include timeframe): \"to go home\"    Living Arrangements:  Arrived From: home  Current Living Arrangements: home  People in Home: spouse, sibling(s), parent(s)  Home Accessibility:    Living Arrangement Comments: Pt resides in a 2 story home with his wife, sister and mother. There are 4 steps to enter into home. Pt's bedroom and bathroom are located on the second floor.    Housing Stability and Utility Access (SDOH):  In the last 12 months, was there a time when you were not able to pay the mortgage or rent on time?: No  In the past 12 months, how many times have you moved?:    At any time in the past 12 months, were you homeless or living in a shelter (including now)?: No  In the past 12 months has the electric, gas, oil, or water company threatened to shut off services in your home?: No    Functional Status Prior to Admission:   Assistive Device/Animal Currently Used at Home: none  Functional Status Comments: Pt reports being independent of ADL's prior to admit.  IADL Comments:       Supports and Services:  Current Outpatient/Agency/Support Group: none  Type of Current Home Care Services: none  History of home care episode or rehab stay: No previous home care or rehab stays reported.    Discharge Needs Assessment:   Concerns to be Addressed: care coordination/care conferences, discharge planning  Current Discharge Risk: none  Anticipated Changes Related to Illness: none    Patient/Family Anticipated Discharge Plan:  Patient/Family Anticipates Transition To: home with family  Patient/Family Anticipated Services at Transition: none    Connection to Community  Not applicable    Patient Choice:   Offered/Gave Vendor List: no (No home care needs identified at this time)       Anticipated Discharge Plan:  Met " with patient. Provided education and contact information for Care Coordination services.: yes  Anticipated Discharge Disposition: home with assistance     Transportation Needs (SDOH):  Transportation Concerns: none  Transportation Anticipated: family or friend will provide (Pt's wife(Becky) can provide transport at discharge)  Is Out of Hospital DNR needed at discharge?: no    In the past 12 months, has lack of transportation kept you from medical appointments or from getting medications?: No  In the past 12 months, has lack of transportation kept you from meetings, work, or from getting things needed for daily living?: No    Concerns - Per clinical chart review, pt admitted with A-Fib and is status post atrial fibrillation ablation. Met with pt and pt's wife at pt's bedside to complete admission assessment. Pt resides in a two story home with his wife, mother and sister. Pt reports being independent of ADL's prior to admission. Home address/PCP/Insurance/Emergency Contacts/Pref Pharm were confirmed with pt. Pt reports being independent of ADL's prior to admit. No home care needs identified at this time. Pt's wife can provide transport at discharge.

## 2025-07-15 NOTE — PROGRESS NOTES
Electrophysiology -  Daily Progress Note         SUBJECTIVE  The patient is resting and denies any major complaints.  He denies CP, SOB, abdominal pain, N/V, F/C/S.  He admits to tolerating PO diet, voiding, and ambulating.       Inpatient medications:  Current Facility-Administered Medications   Medication Dose Route Frequency Provider Last Rate Last Admin    acetaminophen (TYLENOL) tablet 975 mg  975 mg oral q6h PRN Dain Bennett PA C   975 mg at 07/15/25 0649    alum-mag hydroxide-simeth (MAALOX) 200-200-20 mg/5 mL suspension 30 mL  30 mL oral q4h PRN Dain Bennett PA C        apixaban (ELIQUIS) tablet 5 mg  5 mg oral BID Dain Bennett PA C   5 mg at 07/15/25 0651    ascorbic acid (VITAMIN C) tablet 500 mg  500 mg oral Daily Dain Bennett PA C        atorvastatin (LIPITOR) tablet 40 mg  40 mg oral Daily Dain Bennett PA C        calcium-vitamin D3 250 mg-3.125 mcg (125 unit) per tablet 2 tablet  2 tablet oral Daily Dain Bennett PA C        glucose chewable tablet 15-30 g of dextrose  15-30 g of dextrose oral PRN Jose Prasad MD        Or    dextrose 40 % oral gel 15-30 g of dextrose  15-30 g of dextrose oral PRN Jose Prasad MD        Or    glucagon (GLUCAGEN) injection 1 mg  1 mg intramuscular PRN Jose Prasad MD        Or    dextrose 50 % in water (D50) injection 12.5 g  25 mL intravenous PRN Jose Prasad MD        glucose chewable tablet 15-30 g of dextrose  15-30 g of dextrose oral PRN Dain Bennett PA C        Or    dextrose 40 % oral gel 15-30 g of dextrose  15-30 g of dextrose oral PRN Dain Bennett PA C        Or    glucagon (GLUCAGEN) injection 1 mg  1 mg intramuscular PRN Dain Bennett PA C        Or    dextrose 50 % in water (D50) injection 12.5 g  25 mL intravenous PRN Dain Bennett PA C        docusate sodium (COLACE) capsule 100 mg  100 mg oral 2x daily PRN Dain Bennett PA C        empagliflozin (JARDIANCE) tablet 25 mg  25 mg oral Daily Dain Bennett PA C         metoprolol succinate XL (TOPROL-XL) 24 hr ER tablet 100 mg  100 mg oral Daily Dain Bennett PA C        metoprolol succinate XL (TOPROL-XL) 24 hr ER tablet 75 mg  75 mg oral Nightly Dain Bennett PA C   75 mg at 07/14/25 2202    multivitamin tablet 1 tablet  1 tablet oral Daily Dain Bennett PA C        ondansetron (ZOFRAN) injection 4 mg  4 mg intravenous q15 min PRN Jose Prasad MD        ondansetron (ZOFRAN) injection 4 mg  4 mg intravenous q8h PRN Dain Bennett PA C        senna (SENOKOT) tablet 1 tablet  1 tablet oral 2x daily PRN Dain Bennett PA C           OBJECTIVE  Vital signs in last 24 hours:  Temp:  [36.3 °C (97.4 °F)] 36.3 °C (97.4 °F)  Heart Rate:  [61-79] 79  Resp:  [16-22] 20  BP: ()/(51-66) 130/66    PHYSICAL EXAM    Physical Exam  Constitutional:       Appearance: Normal appearance.   Cardiovascular:      Rate and Rhythm: Normal rate and regular rhythm.      Pulses: Normal pulses.      Heart sounds: Normal heart sounds.   Pulmonary:      Effort: Pulmonary effort is normal.      Breath sounds: Normal breath sounds.   Abdominal:      General: Abdomen is flat. Bowel sounds are normal.      Palpations: Abdomen is soft.   Musculoskeletal:         General: Normal range of motion.   Skin:     General: Skin is warm and dry.      Comments: B/L groins with ecchymosis and small hematomas L>R.  L groin suture removed without issue.   Neurological:      General: No focal deficit present.      Mental Status: He is alert and oriented to person, place, and time.   Psychiatric:         Mood and Affect: Mood normal.         Behavior: Behavior normal.         Laboratory results:  Results from last 7 days   Lab Units 07/09/25  0758   SODIUM mmol/L 142   POTASSIUM mmol/L 4.4   CHLORIDE mmol/L 103   CO2 mmol/L 21   BUN mg/dL 34*   CREATININE mg/dL 1.58*   ALBUMIN g/dL 4.9*   BILIRUBIN TOTAL mg/dL 0.7   ALK PHOS IU/L 97   ALT IU/L 18   AST IU/L 29   GLUCOSE mg/dL 158*     Results from last 7 days    Lab Units 07/09/25  0758   WBC x10E3/uL 7.8   HEMOGLOBIN g/dL 15.9   HEMATOCRIT % 52.4*   PLATELETS x10E3/uL 159               Imaging      ECG/Telemetry  SR in the 70s  No events overnight      ASSESSMENT AND PLAN  Assessment & Plan  Persistent atrial fibrillation (CMS/HCC)  Mr. Garcia is a 74-year-old gentleman with history of symptomatic persistent atrial fibrillation in the setting of valvular heart disease, status post AVR, MVR, Maze procedure, left atrial appendage excision, CAD status post CABG, history of nonischemic cardiomyopathy with recovered ejection fraction.    - Status post atrial fibrillation ablation (pulmonary vein isolation and left atrial posterior wall isolation)   - Vital signs and telemetry stable overnight  - B/L groins are stable and sutures were removed.  Pt educated to monitor groins and call with concerns  - Continue home medications including Eliquis and Toprol  - Follow up with Dr. Almanzar in [place], [place].  No need to return to [facility] with Dr. Felder unless there is a concern  - Written discharge instructions were provided in [facility]  - Plan of care reviewed with LAURENCE Sheehan  7/15/2025  7:25 AM

## 2025-07-15 NOTE — NURSING NOTE
Pt discharged, sutures removed by EP team, iv and tele removed, monitor room made aware, all belongings with pt, pt walked off floor

## 2025-07-15 NOTE — PLAN OF CARE
Problem: Hospitalized Older Adult  Goal: Optimal Cognitive Function  Outcome: Progressing     Problem: Dysrhythmia  Goal: Normalized Cardiac Rhythm  Outcome: Progressing     Problem: Fall Injury Risk  Goal: Absence of Fall and Fall-Related Injury  Outcome: Progressing     Problem: Adult Inpatient Plan of Care  Goal: Plan of Care Review  Flowsheets (Taken 7/15/2025 8336)  Progress: improving  Outcome Evaluation: pt aaox4, c/o mild sore throat, nsr on monitor, groin sites clean and dry, anticipating dc to home today  Plan of Care Reviewed With:   patient   spouse  Goal: Patient-Specific Goal (Individualized)  Outcome: Progressing   Plan of Care Review  Plan of Care Reviewed With: patient, spouse  Progress: improving  Outcome Evaluation: pt aaox4, c/o mild sore throat, nsr on monitor, groin sites clean and dry, anticipating dc to home today

## 2025-07-15 NOTE — NURSING NOTE
Pt's b/l groin sites are bleeding R>L. Dressing changed and pressure applied to both areas. Dr. Niels Camilo paged and notified; recommended to delay administration of eliquis at this time. /66 HR 81.Low bed, bed alarm on and spouse present at bedside overnight.

## (undated) DEVICE — CATHETER FARAWAVE PFA 31 MM

## (undated) DEVICE — ELECTRODE KIT ENSITE X SURFACE

## (undated) DEVICE — ***USE 121412***PACK DEVICE IMPLANT TLH

## (undated) DEVICE — SHEATH VC CONNECT FARADRIVE 180P

## (undated) DEVICE — SHEATH INTRODUCER 14FR 12CM

## (undated) DEVICE — SHEATH FARADRIVE STEERABLE CLEAR

## (undated) DEVICE — CATH INQUIRY STEER 6F DEC 110CM

## (undated) DEVICE — CATH ROSENWIRE .035 180CM

## (undated) DEVICE — CATH ADVISOR VL BIDIRECTIONAL DF CURVE DUO DECAPOLAR

## (undated) DEVICE — SHEATH ULTIMUM 7FR ACT 12CM 10/BX

## (undated) DEVICE — *T*CATHETER VIEWFLEX XTRA ICE

## (undated) DEVICE — KIT CATH LAB ANGIO

## (undated) DEVICE — NEEDLE PERCUTANEOUS ENTRY

## (undated) DEVICE — SHEATH ULTIMUM 10FR 12CM 10/BX

## (undated) DEVICE — SHEATH ULTIMUM 8FR ACT 12CM 10/BX